# Patient Record
(demographics unavailable — no encounter records)

---

## 2024-10-28 NOTE — BIRTH HISTORY
[de-identified] :  ex 26 Weeker, NICU stay x 66 days, complicated by RDS requiring intubation x 1 day, followed by CPAP x greater than 1 month, then to nasal canula for about 3 weeks. D/c home on RA and PO feeds.

## 2024-10-28 NOTE — SOCIAL HISTORY
[Mother] : mother [Father] : father [Brother] : brother [Pre-] : Pre- [Bedroom] :  in bedroom [Living Area] : in living area [None] : none [FreeTextEntry1] : started Pre-K this year.  [Smokers in Household] : there are no smokers in the home [de-identified] : 80% carpet in home, due to co-op rules.

## 2024-10-28 NOTE — REASON FOR VISIT
[Routine Follow-Up] : a routine follow-up visit for [Cough] : cough [Wheezing] : wheezing [Medical Records] : medical records [Mother] : mother

## 2024-10-28 NOTE — HISTORY OF PRESENT ILLNESS
[0 x/month] : 0 x/month [None] : None [< or = 2 days/wk] : < than or = 2 days/week [0 - 1/year] : 0 - 1/year [> or = 20] : > than or = 20 [FreeTextEntry1] : Ex 26 Weeker with [facility], RAD,  PICU admission 07/2022 for respiratory distress in the setting of rhino/entero requiring BiPAP. CXR: interstitial opacities consistent with RAD.  Unremarkable cardiac eval Dec 2020. no routine f/u.    FOLLOW UP: Oct 28, 2024 Last seen June 2024, JOHN Jacinto- Recs: allergy eval, Fluticasone Propionate HFA 44mcg 2 puffs Qday Sept-May 2024. Claritin/Zyrtec PRN Albuterol PRN.    Interval hx: - reports compliance with ICS and spacer.  - No interval oral steroids. - No interval ER visits/hospitalizations. - Albuterol last used August 2024.  - Now with URI symptoms for the past week, +cough and nasal congestion. no h/o fever.  Daily meds: Fluticasone Propionate HFA 44mcg 2 puffs once a day.  Rescue meds: albuterol PRN, last used August 2024.  Baseline daytime cough, SOB or wheeze: well between episodes. intermittent cough now with URI for the past week.  Baseline nocturnal cough, SOB or wheeze:  well between episodes. intermittent cough now with URI for the past week.  Exertional cough, SOB or wheeze: at times cough noted when running.  GIANNI sx: denies  JURGEN sx: denies  Allergic rhinitis symptoms: denies  Flu Vaccine 2024- 2025: no, "we typically do not receive".    Modified Asthma Predictive Index: Major risk factors: 1. No parental hx of asthma 2. No known allergic rhinitis, never tested 3. +Hx of eczema, now resolved   [FreeTextEntry7] : 25

## 2024-10-28 NOTE — PHYSICAL EXAM
[Well Nourished] : well nourished [Well Developed] : well developed [Well Groomed] : well groomed [Alert] : ~L alert [Active] : active [Normal Breathing Pattern] : normal breathing pattern [No Respiratory Distress] : no respiratory distress [No Nasal Drainage] : no nasal drainage [No Oral Cyanosis] : no oral cyanosis [No Stridor] : no stridor [Absence Of Retractions] : absence of retractions [Symmetric] : symmetric [Good Expansion] : good expansion [No Acc Muscle Use] : no accessory muscle use [Good aeration to bases] : good aeration to bases [Equal Breath Sounds] : equal breath sounds bilaterally [No Crackles] : no crackles [No Rhonchi] : no rhonchi [No Wheezing] : no wheezing [Normal Sinus Rhythm] : normal sinus rhythm [Soft, Non-Tender] : soft, non-tender [Full ROM] : full range of motion [No Clubbing] : no clubbing [Capillary Refill < 2 secs] : capillary refill less than two seconds [No Cyanosis] : no cyanosis [Abnormal Walk] : normal gait [Alert and  Oriented] : alert and oriented [No Rashes] : no rashes [FreeTextEntry4] : +nasal congestion  [FreeTextEntry5] : +post nasal drip.

## 2024-10-28 NOTE — REVIEW OF SYSTEMS
[Wheezing] : wheezing [Cough] : cough [Eczema] : eczema [Fever] : no fever [Poor Appetite] : no poor appetite [Snoring] : no snoring [Apnea] : no apnea [Frequent Croup] : no frequent croup [Nasal Congestion] : no nasal congestion [Recurrent Ear Infections] : no recurrent ear infections [Heart Disease] : no heart disease [Pneumonia] : no pneumonia [Problems Swallowing] : no problems swallowing [Seizure] : no seizures [Sleep Disturbances] : ~T no sleep disturbances [Failure To Thrive] : no failure to thrive [FreeTextEntry2] : +URI symptoms for the past week. no fever.  [FreeTextEntry5] : Normal cardiac eval in past. no routine f/u indicated.  [FreeTextEntry6] : +intermittent cough. no wheeze.  [Immunizations are up to date] : Immunizations are up to date [Influenza Vaccine this Past Year] : no influenza vaccine this past year

## 2025-02-03 NOTE — BIRTH HISTORY
[de-identified] :  ex 26 Weeker, NICU stay x 66 days, complicated by RDS requiring intubation x 1 day, followed by CPAP x greater than 1 month, then to nasal canula for about 3 weeks. D/c home on RA and PO feeds.

## 2025-02-03 NOTE — REVIEW OF SYSTEMS
[Wheezing] : wheezing [Cough] : cough [Eczema] : eczema [Immunizations are up to date] : Immunizations are up to date [FreeTextEntry2] : +URI symptoms for the past week. no fever.  [Nasal Congestion] : nasal congestion [Fever] : no fever [Poor Appetite] : no poor appetite [Snoring] : no snoring [Apnea] : no apnea [Frequent Croup] : no frequent croup [Recurrent Ear Infections] : no recurrent ear infections [Heart Disease] : no heart disease [Pneumonia] : no pneumonia [Problems Swallowing] : no problems swallowing [Seizure] : no seizures [Sleep Disturbances] : ~T no sleep disturbances [Failure To Thrive] : no failure to thrive [FreeTextEntry5] : Normal cardiac eval in past. no routine f/u indicated.  [FreeTextEntry6] : no current cough/wheeze [Influenza Vaccine this Past Year] : no influenza vaccine this past year

## 2025-02-03 NOTE — HISTORY OF PRESENT ILLNESS
[0 x/month] : 0 x/month [None] : None [< or = 2 days/wk] : < than or = 2 days/week [0 - 1/year] : 0 - 1/year [> or = 20] : > than or = 20 [FreeTextEntry1] : Ex 26 Weeker with [facility], RAD,  PICU admission 07/2022 for respiratory distress in the setting of rhino/entero requiring BiPAP. CXR: interstitial opacities consistent with RAD.  Unremarkable cardiac eval Dec 2020. no routine f/u advised.    FOLLOW UP: Jan 28, 2025 Last seen Oct 2024- Recs: Flu vaccine with PCP, Allergy eval, Fluticasone Propionate HFA 44mcg 2 puffs BID. Flonase x2 weeks. Claritin/Zyrtec PRN Albuterol PRN and 15mins prior to exercise.    Interval hx: - reports compliance with ICS and spacer - overall doing very well. ACT = 25.  - No interval oral steroids. - No interval ER visits/hospitalizations. - Albuterol last used Nov 2024 with URI and prior to soccer (every Saturday).   Daily meds: Fluticasone Propionate HFA 44mcg 2 puffs once a day.  Rescue meds: albuterol PRN Baseline daytime cough, SOB or wheeze:  denies  Baseline nocturnal cough, SOB or wheeze:  denies  Exertional cough, SOB or wheeze: at times cough noted when running, using albuterol 15mins prior with good effect.  GIANNI sx: denies  JURGEN sx: denies  Allergic rhinitis symptoms: denies  Flu Vaccine 2024- 2025: Pending. offered and declined.    Modified Asthma Predictive Index: Major risk factors: 1. No parental hx of asthma 2. No known allergic rhinitis, never tested 3. +Hx of eczema, now resolved   [FreeTextEntry7] : 25

## 2025-02-03 NOTE — BIRTH HISTORY
[de-identified] :  ex 26 Weeker, NICU stay x 66 days, complicated by RDS requiring intubation x 1 day, followed by CPAP x greater than 1 month, then to nasal canula for about 3 weeks. D/c home on RA and PO feeds.

## 2025-02-03 NOTE — PHYSICAL EXAM
[Well Nourished] : well nourished [Well Developed] : well developed [Well Groomed] : well groomed [Alert] : ~L alert [Active] : active [Normal Breathing Pattern] : normal breathing pattern [No Respiratory Distress] : no respiratory distress [No Nasal Drainage] : no nasal drainage [No Oral Cyanosis] : no oral cyanosis [No Stridor] : no stridor [Absence Of Retractions] : absence of retractions [Symmetric] : symmetric [Good Expansion] : good expansion [No Acc Muscle Use] : no accessory muscle use [Good aeration to bases] : good aeration to bases [Equal Breath Sounds] : equal breath sounds bilaterally [No Crackles] : no crackles [No Rhonchi] : no rhonchi [No Wheezing] : no wheezing [Normal Sinus Rhythm] : normal sinus rhythm [Soft, Non-Tender] : soft, non-tender [Full ROM] : full range of motion [No Clubbing] : no clubbing [Capillary Refill < 2 secs] : capillary refill less than two seconds [No Cyanosis] : no cyanosis [Abnormal Walk] : normal gait [Alert and  Oriented] : alert and oriented [FreeTextEntry5] : +postnasal drip and cobble-stoning

## 2025-02-03 NOTE — SOCIAL HISTORY
[Mother] : mother [Father] : father [Brother] : brother [Pre-] : Pre- [Bedroom] :  in bedroom [Living Area] : in living area [None] : none [FreeTextEntry1] : started Pre-K this year.  [Smokers in Household] : there are no smokers in the home [de-identified] : 80% carpet in home, due to co-op rules.

## 2025-02-03 NOTE — BIRTH HISTORY
[de-identified] :  ex 26 Weeker, NICU stay x 66 days, complicated by RDS requiring intubation x 1 day, followed by CPAP x greater than 1 month, then to nasal canula for about 3 weeks. D/c home on RA and PO feeds.

## 2025-02-03 NOTE — SOCIAL HISTORY
[Mother] : mother [Father] : father [Brother] : brother [Pre-] : Pre- [Bedroom] :  in bedroom [Living Area] : in living area [None] : none [FreeTextEntry1] : started Pre-K this year.  [Smokers in Household] : there are no smokers in the home [de-identified] : 80% carpet in home, due to co-op rules.

## 2025-02-03 NOTE — SOCIAL HISTORY
[Mother] : mother [Father] : father [Brother] : brother [Pre-] : Pre- [Bedroom] :  in bedroom [Living Area] : in living area [None] : none [FreeTextEntry1] : started Pre-K this year.  [Smokers in Household] : there are no smokers in the home [de-identified] : 80% carpet in home, due to co-op rules.

## 2025-05-13 NOTE — BIRTH HISTORY
[de-identified] :  ex 26 Weeker, NICU stay x 66 days, complicated by RDS requiring intubation x 1 day, followed by CPAP x greater than 1 month, then to nasal canula for about 3 weeks. D/c home on RA and PO feeds.

## 2025-05-13 NOTE — BIRTH HISTORY
[de-identified] :  ex 26 Weeker, NICU stay x 66 days, complicated by RDS requiring intubation x 1 day, followed by CPAP x greater than 1 month, then to nasal canula for about 3 weeks. D/c home on RA and PO feeds.

## 2025-05-13 NOTE — BIRTH HISTORY
[de-identified] :  ex 26 Weeker, NICU stay x 66 days, complicated by RDS requiring intubation x 1 day, followed by CPAP x greater than 1 month, then to nasal canula for about 3 weeks. D/c home on RA and PO feeds.

## 2025-05-13 NOTE — SOCIAL HISTORY
[Mother] : mother [Father] : father [Brother] : brother [Pre-] : Pre- [Bedroom] :  in bedroom [Living Area] : in living area [None] : none [FreeTextEntry1] : started Pre-K this year.  [Smokers in Household] : there are no smokers in the home [de-identified] : 80% carpet in home, due to co-op rules.

## 2025-05-13 NOTE — REVIEW OF SYSTEMS
[Nasal Congestion] : nasal congestion [Wheezing] : wheezing [Cough] : cough [Eczema] : eczema [Immunizations are up to date] : Immunizations are up to date [Fever] : no fever [Poor Appetite] : no poor appetite [Snoring] : no snoring [Apnea] : no apnea [Frequent Croup] : no frequent croup [Recurrent Ear Infections] : no recurrent ear infections [Heart Disease] : no heart disease [Pneumonia] : no pneumonia [Problems Swallowing] : no problems swallowing [Seizure] : no seizures [Sleep Disturbances] : ~T no sleep disturbances [Failure To Thrive] : no failure to thrive [FreeTextEntry5] : Normal cardiac eval in past. no routine f/u indicated.  [FreeTextEntry6] : no current cough/wheeze [Influenza Vaccine this Past Year] : no influenza vaccine this past year

## 2025-05-13 NOTE — PHYSICAL EXAM
[Well Nourished] : well nourished [Well Developed] : well developed [Well Groomed] : well groomed [Alert] : ~L alert [Active] : active [Normal Breathing Pattern] : normal breathing pattern [No Respiratory Distress] : no respiratory distress [No Oral Cyanosis] : no oral cyanosis [No Stridor] : no stridor [Absence Of Retractions] : absence of retractions [Symmetric] : symmetric [Good Expansion] : good expansion [No Acc Muscle Use] : no accessory muscle use [Good aeration to bases] : good aeration to bases [Equal Breath Sounds] : equal breath sounds bilaterally [No Crackles] : no crackles [No Rhonchi] : no rhonchi [No Wheezing] : no wheezing [Normal Sinus Rhythm] : normal sinus rhythm [Soft, Non-Tender] : soft, non-tender [Full ROM] : full range of motion [No Clubbing] : no clubbing [Capillary Refill < 2 secs] : capillary refill less than two seconds [No Cyanosis] : no cyanosis [Abnormal Walk] : normal gait [Alert and  Oriented] : alert and oriented [No Rashes] : no rashes [FreeTextEntry4] : mild nasal congestion

## 2025-05-13 NOTE — SOCIAL HISTORY
[Mother] : mother [Father] : father [Brother] : brother [Pre-] : Pre- [Bedroom] :  in bedroom [Living Area] : in living area [None] : none [FreeTextEntry1] : started Pre-K this year.  [Smokers in Household] : there are no smokers in the home [de-identified] : 80% carpet in home, due to co-op rules.

## 2025-05-13 NOTE — HISTORY OF PRESENT ILLNESS
[0 x/month] : 0 x/month [None] : None [< or = 2 days/wk] : < than or = 2 days/week [0 - 1/year] : 0 - 1/year [> or = 20] : > than or = 20 [FreeTextEntry1] : Ex 26 Weeker with [facility], RAD,  PICU admission 07/2022 for respiratory distress in the setting of rhino/entero requiring BiPAP. CXR: interstitial opacities consistent with RAD.  Unremarkable cardiac eval Dec 2020. no routine f/u advised.      FOLLOW UP: May 08, 2025 Last seen Jan 2025 - Recs: Allergy eval, continue: Fluticasone Propionate HFA 44mcg 2 puffs BID. Flonase x2 weeks. Claritin/Zyrtec PRN Albuterol PRN and 15mins prior to exercise.    Interval hx: - reports compliance with ICS and spacer - albuterol last used: over one month ago. not needed prior to baseball but used in past with soccer.  - allergy symptoms: +runny nose.  - overall doing very well. ACT = 26 - No interval oral steroids. - No interval ER visits/hospitalizations. - Albuterol last used Nov 2024 with URI and prior to soccer (every Saturday).   Daily meds: Fluticasone Propionate HFA 44mcg 2 puffs once a day.  Rescue meds: albuterol PRN Baseline daytime cough, SOB or wheeze:  denies  Baseline nocturnal cough, SOB or wheeze:  denies  Exertional cough, SOB or wheeze: cough very rarely with exercise. albuterol not used prior to sports consistently.  GIANNI sx: denies  JURGEN sx: denies  Allergic rhinitis symptoms: +runny nose and sneezing.    Modified Asthma Predictive Index: Major risk factors: 1. No parental hx of asthma 2. No known allergic rhinitis, never tested 3. +Hx of eczema  [FreeTextEntry7] : 26 26 26

## 2025-05-13 NOTE — SOCIAL HISTORY
[Mother] : mother [Father] : father [Brother] : brother [Pre-] : Pre- [Bedroom] :  in bedroom [Living Area] : in living area [None] : none [FreeTextEntry1] : started Pre-K this year.  [Smokers in Household] : there are no smokers in the home [de-identified] : 80% carpet in home, due to co-op rules.